# Patient Record
Sex: FEMALE | Race: BLACK OR AFRICAN AMERICAN | Employment: UNEMPLOYED | URBAN - METROPOLITAN AREA
[De-identification: names, ages, dates, MRNs, and addresses within clinical notes are randomized per-mention and may not be internally consistent; named-entity substitution may affect disease eponyms.]

---
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
## 2022-01-20 ENCOUNTER — APPOINTMENT (OUTPATIENT)
Dept: GENERAL RADIOLOGY | Age: 22
End: 2022-01-20
Attending: PHYSICIAN ASSISTANT
Payer: MEDICAID

## 2022-01-20 ENCOUNTER — HOSPITAL ENCOUNTER (EMERGENCY)
Age: 22
Discharge: HOME | End: 2022-01-20
Attending: STUDENT IN AN ORGANIZED HEALTH CARE EDUCATION/TRAINING PROGRAM
Payer: MEDICAID

## 2022-01-20 VITALS
HEART RATE: 74 BPM | WEIGHT: 150 LBS | DIASTOLIC BLOOD PRESSURE: 74 MMHG | HEIGHT: 66 IN | SYSTOLIC BLOOD PRESSURE: 117 MMHG | OXYGEN SATURATION: 100 % | RESPIRATION RATE: 16 BRPM | BODY MASS INDEX: 24.11 KG/M2 | TEMPERATURE: 97.3 F

## 2022-01-20 DIAGNOSIS — T14.8XXA SUPERFICIAL LACERATION: Primary | ICD-10-CM

## 2022-01-20 PROCEDURE — 75810000293 HC SIMP/SUPERF WND  RPR

## 2022-01-20 PROCEDURE — 90715 TDAP VACCINE 7 YRS/> IM: CPT | Performed by: PHYSICIAN ASSISTANT

## 2022-01-20 PROCEDURE — 73110 X-RAY EXAM OF WRIST: CPT

## 2022-01-20 PROCEDURE — 74011250636 HC RX REV CODE- 250/636: Performed by: PHYSICIAN ASSISTANT

## 2022-01-20 PROCEDURE — 90471 IMMUNIZATION ADMIN: CPT

## 2022-01-20 PROCEDURE — 99283 EMERGENCY DEPT VISIT LOW MDM: CPT

## 2022-01-20 RX ADMIN — TETANUS TOXOID, REDUCED DIPHTHERIA TOXOID AND ACELLULAR PERTUSSIS VACCINE, ADSORBED 0.5 ML: 5; 2.5; 8; 8; 2.5 SUSPENSION INTRAMUSCULAR at 22:46
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
## 2022-01-21 NOTE — ED NOTES
Patient wrist laceration repaired per provider with dermabond and steri-strips. Tolerated well. Dressed with sterile guaze and coban.
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
## 2022-02-24 ENCOUNTER — HOSPITAL ENCOUNTER (EMERGENCY)
Age: 22
Discharge: HOME | End: 2022-02-24
Attending: EMERGENCY MEDICINE
Payer: MEDICAID

## 2022-02-24 ENCOUNTER — APPOINTMENT (OUTPATIENT)
Dept: GENERAL RADIOLOGY | Age: 22
End: 2022-02-24
Attending: PHYSICIAN ASSISTANT
Payer: MEDICAID

## 2022-02-24 VITALS
RESPIRATION RATE: 16 BRPM | DIASTOLIC BLOOD PRESSURE: 74 MMHG | TEMPERATURE: 98.6 F | HEIGHT: 65 IN | HEART RATE: 74 BPM | SYSTOLIC BLOOD PRESSURE: 112 MMHG | WEIGHT: 150 LBS | BODY MASS INDEX: 24.99 KG/M2 | OXYGEN SATURATION: 100 %

## 2022-02-24 DIAGNOSIS — S61.216A LACERATION OF RIGHT LITTLE FINGER WITHOUT FOREIGN BODY WITHOUT DAMAGE TO NAIL, INITIAL ENCOUNTER: Primary | ICD-10-CM

## 2022-02-24 PROCEDURE — 99283 EMERGENCY DEPT VISIT LOW MDM: CPT

## 2022-02-24 PROCEDURE — 75810000293 HC SIMP/SUPERF WND  RPR

## 2022-02-24 PROCEDURE — 74011250637 HC RX REV CODE- 250/637: Performed by: PHYSICIAN ASSISTANT

## 2022-02-24 PROCEDURE — 73140 X-RAY EXAM OF FINGER(S): CPT

## 2022-02-24 RX ORDER — ACETAMINOPHEN 500 MG
1000 TABLET ORAL
Status: COMPLETED | OUTPATIENT
Start: 2022-02-24 | End: 2022-02-24

## 2022-02-24 RX ADMIN — ACETAMINOPHEN 1000 MG: 500 TABLET ORAL at 14:12

## 2022-02-24 NOTE — Clinical Note
Perryshawanda Duncan was seen and treated in our emergency department on 2/24/2022.     May return to work when hand wound has completely healed     Kourtney Prima, [address]
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
## 2022-02-24 NOTE — Clinical Note
Addis Song was seen and treated in our emergency department on 2/24/2022.     May return to work when hand wound has completely healed     Kourtney Deluna [facility]

## 2022-03-02 ENCOUNTER — TELEPHONE (OUTPATIENT)
Dept: ORTHOPEDIC SURGERY | Age: 22
End: 2022-03-02

## 2022-03-02 NOTE — TELEPHONE ENCOUNTER
New Patient called and said she was referred to [facility] for :  No fracture or destructive osseous lesion. The joint spaces are maintained. Soft  tissue swelling of the distal pinky finger. No radiopaque foreign object. Patient said she went to a [facility] location in [place]. Patient said she was seen on 2/24/2022 , and that she has stitches. That they told her to follow up with . Patient would like to know when [facility] will be able to see her. Said she can go to [facility]. Patient tel. [phone]. Note : Xray in Epic on 02/24/2022.

## 2022-03-04 ENCOUNTER — OFFICE VISIT (OUTPATIENT)
Dept: ORTHOPEDIC SURGERY | Age: 22
End: 2022-03-04
Payer: MEDICAID

## 2022-03-04 VITALS — WEIGHT: 155 LBS | TEMPERATURE: 97.4 F | HEIGHT: 65 IN | BODY MASS INDEX: 25.83 KG/M2

## 2022-03-04 DIAGNOSIS — S61.216A LACERATION OF RIGHT LITTLE FINGER WITHOUT FOREIGN BODY WITHOUT DAMAGE TO NAIL, INITIAL ENCOUNTER: Primary | ICD-10-CM

## 2022-03-04 PROCEDURE — 99203 OFFICE O/P NEW LOW 30 MIN: CPT | Performed by: ORTHOPAEDIC SURGERY

## 2022-03-04 NOTE — PROGRESS NOTES
Dea Persaud is a 24 y.o. female right handed unknown employment. Worker's Compensation and legal considerations: none filed. Vitals:    03/04/22 1439   Temp: 97.4 °F (36.3 °C)   TempSrc: Temporal   Weight: 155 lb (70.3 kg)   Height: 5' 5\" (1.651 m)   PainSc:   2   PainLoc: Finger           Chief Complaint   Patient presents with    Finger Pain     Laceration on Right 5th digit         HPI: Patient presents today for follow-up of right little finger laceration. She was seen in the ER 8 days prior and sutured. Date of onset: 2/24/2022    Injury: Yes: Comment: Punched through some glass    Prior Treatment:  Yes: Comment: ED laceration closure    Numbness/ Tingling: No      ROS: Review of Systems - General ROS: negative  Psychological ROS: negative  ENT ROS: negative  Allergy and Immunology ROS: negative  Hematological and Lymphatic ROS: negative  Respiratory ROS: no cough, shortness of breath, or wheezing  Cardiovascular ROS: no chest pain or dyspnea on exertion  Gastrointestinal ROS: no abdominal pain, change in bowel habits, or black or bloody stools  Musculoskeletal ROS: negative  Neurological ROS: negative  Dermatological ROS: negative    Past Medical History:   Diagnosis Date    Overdose        History reviewed. No pertinent surgical history. Current Outpatient Medications   Medication Sig Dispense Refill    pyridoxine, vitamin B6, (VITAMIN B-6) 100 mg tablet Take 1 Tab by mouth daily. (Patient not taking: Reported on 3/4/2022) 30 Tab 0    Thiamine Mononitrate (B-1) 100 mg tablet Take 1 Tab by mouth daily. (Patient not taking: Reported on 3/4/2022) 30 Tab 0    multivitamin-iron-FA, hematinic, (CENTRUM COMPLETE)  mg-mcg tab tablet Take 1 Tab by mouth daily. Can sub with another MVI (Patient not taking: Reported on 3/4/2022) 30 Tab 0    etonogestrel (NEXPLANON) 68 mg impl by SubDERmal route.  (Patient not taking: Reported on 3/4/2022)         No Known Allergies        PE:     Physical Exam  Vitals and nursing note reviewed. Constitutional:       General: She is not in acute distress. Appearance: Normal appearance. She is not ill-appearing. Cardiovascular:      Pulses: Normal pulses. Pulmonary:      Effort: Pulmonary effort is normal. No respiratory distress. Musculoskeletal:         General: Swelling and tenderness present. No deformity or signs of injury. Normal range of motion. Cervical back: Normal range of motion and neck supple. Right lower leg: No edema. Left lower leg: No edema. Skin:     General: Skin is warm and dry. Capillary Refill: Capillary refill takes less than 2 seconds. Findings: No bruising or erythema. Neurological:      General: No focal deficit present. Mental Status: She is alert and oriented to person, place, and time. Psychiatric:         Mood and Affect: Mood normal.         Behavior: Behavior normal.            Right hand: There is a laceration at the palmar digital crease on the volar surface. This has been closed with Prolene and glue. There is minimal range of motion due to stiffness and pain. There is a palm to pulp distance of 3 cm. Neurovascularly intact distally. Imaging:     Previous injury plain films of right wrist and right little finger does not show any fracture, dislocation, or radiopaque foreign body. ICD-10-CM ICD-9-CM    1. Laceration of right little finger without foreign body without damage to nail, initial encounter  S61.216A 883.0          Plan:     Discussed wound care with the patient as well as progressing through gentle range of motion exercises over the next week. Follow-up and Dispositions    · Return in about 5 days (around 3/9/2022) for Reevaluation, suture removal, and possible OT if needed.           Plan was reviewed with patient, who verbalized agreement and understanding of the plan

## 2022-03-09 ENCOUNTER — OFFICE VISIT (OUTPATIENT)
Dept: ORTHOPEDIC SURGERY | Age: 22
End: 2022-03-09
Payer: MEDICAID

## 2022-03-09 VITALS — HEART RATE: 77 BPM | WEIGHT: 155 LBS | OXYGEN SATURATION: 99 % | HEIGHT: 65 IN | BODY MASS INDEX: 25.83 KG/M2

## 2022-03-09 DIAGNOSIS — S61.216D LACERATION OF RIGHT LITTLE FINGER WITHOUT FOREIGN BODY WITHOUT DAMAGE TO NAIL, SUBSEQUENT ENCOUNTER: Primary | ICD-10-CM

## 2022-03-09 PROCEDURE — 99213 OFFICE O/P EST LOW 20 MIN: CPT | Performed by: ORTHOPAEDIC SURGERY

## 2022-03-09 NOTE — PROGRESS NOTES
Lakshmi Raymond is a 24 y.o. female right handed unknown employment. Worker's Compensation and legal considerations: none filed. Vitals:    03/09/22 0822   Pulse: 77   SpO2: 99%   Weight: 155 lb (70.3 kg)   Height: 5' 5\" (1.651 m)   PainSc:   0 - No pain   PainLoc: Finger           Chief Complaint   Patient presents with    Finger Pain     right pinky       HPI: Patient presents today for follow-up and suture removal status post right little finger laceration nearly 2 weeks prior. She reports minimal pain and improvement in her motion. Initial HPI: Patient presents today for follow-up of right little finger laceration. She was seen in the ER 8 days prior and sutured. Date of onset: 2/24/2022    Injury: Yes: Comment: Punched through some glass    Prior Treatment:  Yes: Comment: ED laceration closure    Numbness/ Tingling: No      ROS: Review of Systems - General ROS: negative  Psychological ROS: negative  ENT ROS: negative  Allergy and Immunology ROS: negative  Hematological and Lymphatic ROS: negative  Respiratory ROS: no cough, shortness of breath, or wheezing  Cardiovascular ROS: no chest pain or dyspnea on exertion  Gastrointestinal ROS: no abdominal pain, change in bowel habits, or black or bloody stools  Musculoskeletal ROS: negative  Neurological ROS: negative  Dermatological ROS: negative    Past Medical History:   Diagnosis Date    Overdose        History reviewed. No pertinent surgical history. Current Outpatient Medications   Medication Sig Dispense Refill    pyridoxine, vitamin B6, (VITAMIN B-6) 100 mg tablet Take 1 Tab by mouth daily. (Patient not taking: Reported on 3/4/2022) 30 Tab 0    Thiamine Mononitrate (B-1) 100 mg tablet Take 1 Tab by mouth daily. (Patient not taking: Reported on 3/4/2022) 30 Tab 0    multivitamin-iron-FA, hematinic, (CENTRUM COMPLETE)  mg-mcg tab tablet Take 1 Tab by mouth daily.  Can sub with another MVI (Patient not taking: Reported on 3/4/2022) 30 Tab 0    etonogestrel (NEXPLANON) 68 mg impl by SubDERmal route. (Patient not taking: Reported on 3/4/2022)         No Known Allergies        PE:     Physical Exam  Vitals and nursing note reviewed. Constitutional:       General: She is not in acute distress. Appearance: Normal appearance. She is not ill-appearing. Cardiovascular:      Pulses: Normal pulses. Pulmonary:      Effort: Pulmonary effort is normal. No respiratory distress. Musculoskeletal:         General: No swelling, tenderness, deformity or signs of injury. Normal range of motion. Cervical back: Normal range of motion and neck supple. Right lower leg: No edema. Left lower leg: No edema. Skin:     General: Skin is warm and dry. Capillary Refill: Capillary refill takes less than 2 seconds. Findings: No bruising or erythema. Neurological:      General: No focal deficit present. Mental Status: She is alert and oriented to person, place, and time. Psychiatric:         Mood and Affect: Mood normal.         Behavior: Behavior normal.            Right hand: Laceration over the palmar aspect of the right little finger shows significant signs of healing since previous visit. Sutures have been removed today. Range of motion is full. Neurovascularly intact. Imaging:     Previous injury plain films of right wrist and right little finger does not show any fracture, dislocation, or radiopaque foreign body. ICD-10-CM ICD-9-CM    1. Laceration of right little finger without foreign body without damage to nail, subsequent encounter  S61.216D V58.89          Plan:     Discussed continued range of motion exercises and scar care. Follow-up and Dispositions    · Return if symptoms worsen or fail to improve.           Plan was reviewed with patient, who verbalized agreement and understanding of the plan